# Patient Record
Sex: FEMALE | Race: WHITE | NOT HISPANIC OR LATINO | Employment: UNEMPLOYED | URBAN - METROPOLITAN AREA
[De-identification: names, ages, dates, MRNs, and addresses within clinical notes are randomized per-mention and may not be internally consistent; named-entity substitution may affect disease eponyms.]

---

## 2017-09-07 ENCOUNTER — ALLSCRIPTS OFFICE VISIT (OUTPATIENT)
Dept: OTHER | Facility: OTHER | Age: 37
End: 2017-09-07

## 2017-09-11 LAB
HPV 18 (HISTORICAL): NOT DETECTED
HPV HIGH RISK 16/18 (HISTORICAL): NOT DETECTED
HPV16 (HISTORICAL): NOT DETECTED
PAP (HISTORICAL): NORMAL

## 2018-01-13 VITALS
DIASTOLIC BLOOD PRESSURE: 80 MMHG | SYSTOLIC BLOOD PRESSURE: 116 MMHG | BODY MASS INDEX: 22.66 KG/M2 | WEIGHT: 141 LBS | HEIGHT: 66 IN

## 2018-01-16 NOTE — PROGRESS NOTES
2016         RE: Marderickhelen Murillo                                  To: [facility]   MR#: [id number]                                   [address]   : [facility]   ENC: [id number]                             Fax: [phone]   (Exam #: [id number])      The LMP of this 28year old,  5, para 3 patient was ,   giving her an RUPA of 2016 and a current gestational age of 29 weeks   6 days by dates  A sonographic examination was performed on 2016   using real time equipment  The ultrasound examination was performed using   abdominal technique  The patient has a BMI of 25 3  Her blood pressure   today was 119/73  Earliest ultrasound found in her record: 8/27/15  8w2d  16 RUPA            Cardiac motion was observed at 144 bpm       INDICATIONS      advanced maternal age   abnormal uterine Doppler      Exam Types      Level I      RESULTS      Fetus # 1 of 1   Vertex presentation   Fetal growth appeared normal   Placenta Location = Posterior, fundal   No placenta previa   Placenta Grade = I      MEASUREMENTS (* Included In Average GA)      OFD             10 8 cm   AC              30 6 cm        34 weeks 5 days* (61%)   BPD              8 6 cm        34 weeks 4 days* (57%)   HC              30 8 cm        33 weeks 6 days* (36%)   Femur            6 5 cm        33 weeks 1 day * (41%)      HC/AC           1 01   FL/AC           0 21   FL/BPD          0 75   EFW (Ac/Fl/Hc)  2355 grams - 5 lbs 3 oz                 (47%)      THE AVERAGE GESTATIONAL AGE is 34 weeks 1 day +/- 21 days        AMNIOTIC FLUID      Q1: 4 5      Q2: 2 0      Q3: 3 7      Q4: 3 4   VIVIANA Total = 13 7 cm   Amniotic Fluid: Normal      FETAL VESSELS                                     S/D   PI    RI    PSV   AEDV RF                                                    cm/s       Umbilical Artery                 0 96              No No      ANATOMY DETAILS      Visualized Appearing Sonographically Normal:   HEAD: (BPD Level, Lateral Ventricles); HEART: (Four Chamber View,   Proximal Left Outflow, Proximal Right Outflow, Cardiac Axis, Cardiac   Position);    STOMACH, RIGHT KIDNEY, LEFT KIDNEY, BLADDER, PLACENTA, UMBL  CORD      Not Visualized:   HEAD: (Cerebellum, Cisterna Magna)      IMPRESSION      [place] IUP   34 weeks and 1 day by this ultrasound  (RUPA=MAR 30 2016)   Vertex presentation   Fetal growth appeared normal   Regular fetal heart rate of 144 bpm   Posterior, fundal placenta   No placenta previa      GENERAL COMMENT      On exam today the patient appears well, in no acute distress, and denies   any complaints other relatively frequent Oh Bhardwaj contractions   Her   abdomen is non-tender  There has been appropriate interval fetal growth  Good fetal movement and   tone are seen  The amniotic fluid volume appears normal   The placenta is   posterior and it appears sonographically normal   Fetal Dopplers are   normal for gestational age  The anatomic structures listed above could   not be optimally imaged today because of fetal position; however, these   structures were seen on a prior ultrasound and appeared sonographically   normal   The patient was informed of today's findings and all of her   questions were answered  The limitations of ultrasound were reviewed with   the patient, which she accepts   labor precautions were discussed   and the patient was encouraged to contact her Obstetrician should she   experience any signs or symptoms of  labor  Fetal movement   assessment was reviewed with the patient  The patient verbalized her   understanding of these instructions  Recommend further ultrasounds as clinically indicated        Please note, in addition to the time spent discussing the results of the   ultrasound, I spent approximately 10 minutes of face-to-face time with the   patient, greater than 50% of which was spent in counseling and the   coordination of care for this patient  Thank you very much for allowing us to participate in the care of this   very nice patient  Should you have any questions, please do not hesitate   to contact our office  CATHY Linares M D     Electronically signed 02/18/16 12:43

## 2018-08-23 ENCOUNTER — TELEPHONE (OUTPATIENT)
Dept: OBGYN CLINIC | Facility: CLINIC | Age: 38
End: 2018-08-23

## 2018-09-13 ENCOUNTER — ANNUAL EXAM (OUTPATIENT)
Dept: OBGYN CLINIC | Facility: CLINIC | Age: 38
End: 2018-09-13
Payer: COMMERCIAL

## 2018-09-13 VITALS
BODY MASS INDEX: 22.34 KG/M2 | SYSTOLIC BLOOD PRESSURE: 116 MMHG | WEIGHT: 139 LBS | HEIGHT: 66 IN | DIASTOLIC BLOOD PRESSURE: 80 MMHG

## 2018-09-13 DIAGNOSIS — Z01.419 GYNECOLOGIC EXAM NORMAL: Primary | ICD-10-CM

## 2018-09-13 DIAGNOSIS — N92.6 IRREGULAR BLEEDING: ICD-10-CM

## 2018-09-13 PROCEDURE — S0612 ANNUAL GYNECOLOGICAL EXAMINA: HCPCS | Performed by: PHYSICIAN ASSISTANT

## 2018-09-13 NOTE — PROGRESS NOTES
Assessment/Plan   Problem List Items Addressed This Visit     Gynecologic exam normal - Primary     Pap guidelines reviewed  Pap deferred secondary to negative pap and HPV in 2017  Reviewed spotting a few day prior to menses, reassured can be normal, no polyps or cervical abnormalities seen on exam today  Will get TFTs to further evaluate  If continues, worsens, occurs between menses or with intercourse can consider pelvic ultrasound to evaluate for endometrial polyp  Patient will continue to monitor  Return to office for annual or as needed  Other Visit Diagnoses     Irregular bleeding        Relevant Orders    TSH, 3rd generation    T4, free          Subjective:     Patient ID: Meg Chacon is a 40 y o  y o  female  HPI  41 yo seen for annual exam  Menses regular every 24-38, noticing over the past 5 months or so that she gets 2-3 days of spotting prior to menses  No other bleeding in between menses or with intercourse  Has noticed menses heavier and slightly more cramping since she has had kids, tolerable  Denies bowel or bladder issues  Last pap: 9/7/2017 NILM (-)HRHPV  Always normal, monogamous with   The following portions of the patient's history were reviewed and updated as appropriate:   She  has a past medical history of Anxiety; Basal cell carcinoma; Gestational hypertension (3/21/2016); and Varicella  She   Patient Active Problem List    Diagnosis Date Noted    Gynecologic exam normal 09/13/2018    Other fatigue 12/18/2015    Advanced maternal age in multigravida 10/23/2015     She  has a past surgical history that includes [place] tooth extraction and Ovary surgery    Her family history includes Asthma in her mother, sister, and sister; Colon cancer in her paternal uncle; Dementia in her paternal grandfather; Diabetes in her father and paternal grandfather; Diabetes type II in her paternal grandfather; Heart attack in her maternal grandfather; Hypertension in her mother; Hyperthyroidism in her sister; Hypothyroidism in her maternal grandmother; Lung cancer in her maternal grandmother  She  reports that she has never smoked  She has never used smokeless tobacco  She reports that she does not drink alcohol or use drugs  Current Outpatient Prescriptions   Medication Sig Dispense Refill    acetaminophen (TYLENOL) 325 mg tablet Take 2 tablets (650 mg total) by mouth every 4 (four) hours as needed for headaches  30 tablet 0    Pediatric Multiple Vit-C-FA (FLINSTONES GUMMIES OMEGA-3 DHA) CHEW Chew 1 tablet daily Indications: pt doesnt take regulary  No current facility-administered medications for this visit  She has No Known Allergies       Menstrual History:  OB History      Para Term  AB Living    5 4 4   1 4    SAB TAB Ectopic Multiple Live Births    1     0 4        Obstetric Comments    [place], abnormal uterine artery dopplers         Menarche age: 15  Patient's last menstrual period was 2018 (exact date)  Period Cycle (Days): 28  Period Duration (Days): 5-6  Period Pattern: Regular  Menstrual Flow: Moderate  Dysmenorrhea: None      Review of Systems   Constitutional: Negative for fatigue, fever and unexpected weight change  HENT: Negative for dental problem and sinus pressure  Eyes: Negative for visual disturbance  Respiratory: Negative for cough, shortness of breath and wheezing  Cardiovascular: Negative for chest pain  Gastrointestinal: Negative for abdominal pain, blood in stool, constipation, diarrhea, nausea and vomiting  Endocrine: Negative for polydipsia  Genitourinary: Negative for difficulty urinating, dyspareunia, dysuria, frequency, hematuria, pelvic pain and urgency  Musculoskeletal: Negative for arthralgias and back pain  Neurological: Negative for dizziness, seizures, light-headedness and headaches  Psychiatric/Behavioral: Negative for suicidal ideas  The patient is not nervous/anxious  Objective:  Vitals:    09/13/18 1042   BP: 116/80   BP Location: Left arm   Patient Position: Sitting   Cuff Size: Standard   Weight: 63 kg (139 lb)   Height: 5' 6" (1 676 m)      Physical Exam   Constitutional: She is oriented to person, place, and time  She appears well-developed and well-nourished  Genitourinary: Vagina normal and uterus normal  There is no rash, tenderness, lesion, injury or Bartholin's cyst on the right labia  There is no rash, tenderness, lesion, injury or Bartholin's cyst on the left labia  Vagina exhibits no lesion  No erythema, tenderness or bleeding in the vagina  No signs of injury around the vagina  No vaginal discharge found  Right adnexum does not display mass, does not display tenderness and does not display fullness  Left adnexum does not display mass, does not display tenderness and does not display fullness  Cervix does not exhibit motion tenderness, lesion or discharge  Uterus is not enlarged, tender, exhibiting a mass, irregular (is regular) or mobile  HENT:   Head: Normocephalic and atraumatic  Neck: No thyromegaly present  Cardiovascular: Normal rate, regular rhythm and normal heart sounds  Exam reveals no gallop and no friction rub  No murmur heard  Pulmonary/Chest: Effort normal and breath sounds normal  No respiratory distress  She has no wheezes  Right breast exhibits no inverted nipple, no mass, no nipple discharge, no skin change and no tenderness  Left breast exhibits no inverted nipple, no mass, no nipple discharge, no skin change and no tenderness  Breasts are symmetrical  There is no breast swelling  Abdominal: Soft  She exhibits no distension and no mass  There is no tenderness  There is no rebound and no guarding  No hernia  Lymphadenopathy:     She has no cervical adenopathy  Right: No inguinal adenopathy present  Left: No inguinal adenopathy present  Neurological: She is alert and oriented to person, place, and time     Skin: Skin is warm and dry  Psychiatric: She has a normal mood and affect   Her behavior is normal

## 2018-09-13 NOTE — ASSESSMENT & PLAN NOTE
Pap guidelines reviewed  Pap deferred secondary to negative pap and HPV in 2017  Reviewed spotting a few day prior to menses, reassured can be normal, no polyps or cervical abnormalities seen on exam today  Will get TFTs to further evaluate  If continues, worsens, occurs between menses or with intercourse can consider pelvic ultrasound to evaluate for endometrial polyp  Patient will continue to monitor  Return to office for annual or as needed

## 2019-09-27 ENCOUNTER — ANNUAL EXAM (OUTPATIENT)
Dept: OBGYN CLINIC | Facility: CLINIC | Age: 39
End: 2019-09-27
Payer: COMMERCIAL

## 2019-09-27 VITALS
WEIGHT: 140 LBS | DIASTOLIC BLOOD PRESSURE: 78 MMHG | BODY MASS INDEX: 22.5 KG/M2 | SYSTOLIC BLOOD PRESSURE: 112 MMHG | HEIGHT: 66 IN

## 2019-09-27 DIAGNOSIS — Z01.419 GYNECOLOGIC EXAM NORMAL: Primary | ICD-10-CM

## 2019-09-27 PROCEDURE — S0612 ANNUAL GYNECOLOGICAL EXAMINA: HCPCS | Performed by: PHYSICIAN ASSISTANT

## 2019-09-27 NOTE — PROGRESS NOTES
Assessment/Plan   Problem List Items Addressed This Visit        Other    Gynecologic exam normal - Primary     Pap guidelines reviewed  Pap deferred secondary to negative pap and HPV in 2017 in this low risk patient  Reviewed umbilical hernia and small diastasis recti in length with patient  Aware may need surgery for umbilical hernia at some point, would preferably wait until she is done having children  Reviewed s/s of strangulation to go to ER with  Return to office for annual or as needed  Subjective:     Patient ID: Sana Stokes is a 45 y o  y o  female  HPI  44 yo seen for annual exam  Menses regular, heavy at times, but tolerable  Denies bowel or bladder issues  Using barrier methods for birth control declines another method at this time  Patient has umbilical hernia, reports hurts every now and again but pain subsides quickly  Unsure if done having children at this time  Last pap: 9/7/2017 NILM (-)HRHPV  The following portions of the patient's history were reviewed and updated as appropriate:   She  has a past medical history of Anxiety, Basal cell carcinoma, Gestational hypertension (3/21/2016), and Varicella  She   Patient Active Problem List    Diagnosis Date Noted    Gynecologic exam normal 09/13/2018    Other fatigue 12/18/2015    Advanced maternal age in multigravida 10/23/2015     She  has a past surgical history that includes [place] tooth extraction and Ovary surgery  Her family history includes Asthma in her mother, sister, and sister; Colon cancer in her paternal uncle; Dementia in her paternal grandfather; Diabetes in her father and paternal grandfather; Diabetes type II in her paternal grandfather; Heart attack in her maternal grandfather; Hypertension in her mother; Hyperthyroidism in her sister; Hypothyroidism in her maternal grandmother; Lung cancer in her maternal grandmother  She  reports that she has never smoked   She has never used smokeless tobacco  She reports that she does not drink alcohol or use drugs  No current outpatient medications on file  No current facility-administered medications for this visit  She has No Known Allergies       Menstrual History:  OB History        5    Para   4    Term   4            AB   1    Living   4       SAB   1    TAB        Ectopic        Multiple   0    Live Births   4           Obstetric Comments   [place], abnormal uterine artery dopplers            Menarche age: 15  Patient's last menstrual period was 2019  Period Cycle (Days): 27  Period Duration (Days): 6-7  Period Pattern: Regular  Menstrual Flow: Moderate, Heavy  Dysmenorrhea: None      Review of Systems   Constitutional: Negative for fatigue, fever and unexpected weight change  HENT: Negative for dental problem and sinus pressure  Eyes: Negative for visual disturbance  Respiratory: Negative for cough, shortness of breath and wheezing  Cardiovascular: Negative for chest pain  Gastrointestinal: Negative for abdominal pain, blood in stool, constipation, diarrhea, nausea and vomiting  Endocrine: Negative for polydipsia  Genitourinary: Negative for difficulty urinating, dyspareunia, dysuria, frequency, hematuria, pelvic pain and urgency  Musculoskeletal: Negative for arthralgias and back pain  Neurological: Negative for dizziness, seizures, light-headedness and headaches  Psychiatric/Behavioral: Negative for suicidal ideas  The patient is not nervous/anxious  Objective:  Vitals:    19 1030   BP: 112/78   BP Location: Left arm   Patient Position: Sitting   Cuff Size: Standard   Weight: 63 5 kg (140 lb)   Height: 5' 6" (1 676 m)      Physical Exam   Constitutional: She is oriented to person, place, and time  She appears well-developed and well-nourished  Genitourinary: Vagina normal and uterus normal  There is no rash, tenderness, lesion, injury or Bartholin's cyst on the right labia   There is no rash, tenderness, lesion, injury or Bartholin's cyst on the left labia  Vagina exhibits no lesion  No erythema, tenderness or bleeding in the vagina  No signs of injury around the vagina  No vaginal discharge found  Right adnexum does not display mass, does not display tenderness and does not display fullness  Left adnexum does not display mass, does not display tenderness and does not display fullness  Cervix does not exhibit motion tenderness, lesion or discharge  Uterus is not enlarged, tender, exhibiting a mass, irregular (is regular) or mobile  HENT:   Head: Normocephalic and atraumatic  Neck: No thyromegaly present  Cardiovascular: Normal rate, regular rhythm and normal heart sounds  Exam reveals no gallop and no friction rub  No murmur heard  Pulmonary/Chest: Effort normal and breath sounds normal  No respiratory distress  She has no wheezes  Right breast exhibits no inverted nipple, no mass, no nipple discharge, no skin change and no tenderness  Left breast exhibits no inverted nipple, no mass, no nipple discharge, no skin change and no tenderness  No breast swelling  Breasts are symmetrical    Abdominal: Soft  She exhibits no distension and no mass  There is no tenderness  There is no rebound and no guarding  A hernia (umbilical hernia, reducible without pain) is present  Lymphadenopathy:     She has no cervical adenopathy  Right: No inguinal adenopathy present  Left: No inguinal adenopathy present  Neurological: She is alert and oriented to person, place, and time  Skin: Skin is warm and dry  Psychiatric: She has a normal mood and affect   Her behavior is normal

## 2019-09-27 NOTE — ASSESSMENT & PLAN NOTE
Pap guidelines reviewed  Pap deferred secondary to negative pap and HPV in 2017 in this low risk patient  Reviewed umbilical hernia and small diastasis recti in length with patient  Aware may need surgery for umbilical hernia at some point, would preferably wait until she is done having children  Reviewed s/s of strangulation to go to ER with  Return to office for annual or as needed

## 2019-10-19 ENCOUNTER — DOCUMENTATION (OUTPATIENT)
Dept: LABOR AND DELIVERY | Facility: HOSPITAL | Age: 39
End: 2019-10-19

## 2019-10-19 DIAGNOSIS — N92.6 IRREGULAR MENSES: Primary | ICD-10-CM

## 2019-10-20 ENCOUNTER — APPOINTMENT (OUTPATIENT)
Dept: LAB | Facility: MEDICAL CENTER | Age: 39
End: 2019-10-20
Payer: COMMERCIAL

## 2019-10-20 DIAGNOSIS — N92.6 IRREGULAR MENSES: ICD-10-CM

## 2019-10-20 LAB
B-HCG SERPL-ACNC: <2 MIU/ML
BASOPHILS # BLD AUTO: 0.05 THOUSANDS/ΜL (ref 0–0.1)
BASOPHILS NFR BLD AUTO: 1 % (ref 0–1)
EOSINOPHIL # BLD AUTO: 0.39 THOUSAND/ΜL (ref 0–0.61)
EOSINOPHIL NFR BLD AUTO: 6 % (ref 0–6)
ERYTHROCYTE [DISTWIDTH] IN BLOOD BY AUTOMATED COUNT: 13 % (ref 11.6–15.1)
FSH SERPL-ACNC: 6 MIU/ML
HCT VFR BLD AUTO: 38.1 % (ref 34.8–46.1)
HGB BLD-MCNC: 12 G/DL (ref 11.5–15.4)
IMM GRANULOCYTES # BLD AUTO: 0.01 THOUSAND/UL (ref 0–0.2)
IMM GRANULOCYTES NFR BLD AUTO: 0 % (ref 0–2)
LH SERPL-ACNC: 9.3 MIU/ML
LYMPHOCYTES # BLD AUTO: 2.22 THOUSANDS/ΜL (ref 0.6–4.47)
LYMPHOCYTES NFR BLD AUTO: 36 % (ref 14–44)
MCH RBC QN AUTO: 27.3 PG (ref 26.8–34.3)
MCHC RBC AUTO-ENTMCNC: 31.5 G/DL (ref 31.4–37.4)
MCV RBC AUTO: 87 FL (ref 82–98)
MONOCYTES # BLD AUTO: 0.73 THOUSAND/ΜL (ref 0.17–1.22)
MONOCYTES NFR BLD AUTO: 12 % (ref 4–12)
NEUTROPHILS # BLD AUTO: 2.81 THOUSANDS/ΜL (ref 1.85–7.62)
NEUTS SEG NFR BLD AUTO: 45 % (ref 43–75)
NRBC BLD AUTO-RTO: 0 /100 WBCS
PLATELET # BLD AUTO: 289 THOUSANDS/UL (ref 149–390)
PMV BLD AUTO: 9.2 FL (ref 8.9–12.7)
RBC # BLD AUTO: 4.39 MILLION/UL (ref 3.81–5.12)
T4 FREE SERPL-MCNC: 0.89 NG/DL (ref 0.76–1.46)
TSH SERPL DL<=0.05 MIU/L-ACNC: 3.29 UIU/ML (ref 0.36–3.74)
WBC # BLD AUTO: 6.21 THOUSAND/UL (ref 4.31–10.16)

## 2019-10-20 PROCEDURE — 84702 CHORIONIC GONADOTROPIN TEST: CPT

## 2019-10-20 PROCEDURE — 85025 COMPLETE CBC W/AUTO DIFF WBC: CPT

## 2019-10-20 PROCEDURE — 36415 COLL VENOUS BLD VENIPUNCTURE: CPT

## 2019-10-20 PROCEDURE — 83001 ASSAY OF GONADOTROPIN (FSH): CPT

## 2019-10-20 PROCEDURE — 84443 ASSAY THYROID STIM HORMONE: CPT

## 2019-10-20 PROCEDURE — 84439 ASSAY OF FREE THYROXINE: CPT

## 2019-10-20 PROCEDURE — 83002 ASSAY OF GONADOTROPIN (LH): CPT

## 2020-04-24 ENCOUNTER — DOCUMENTATION (OUTPATIENT)
Dept: OBGYN CLINIC | Facility: CLINIC | Age: 40
End: 2020-04-24

## 2020-04-24 DIAGNOSIS — N92.6 IRREGULAR MENSES: Primary | ICD-10-CM

## 2020-12-09 ENCOUNTER — ANNUAL EXAM (OUTPATIENT)
Dept: OBGYN CLINIC | Facility: CLINIC | Age: 40
End: 2020-12-09
Payer: COMMERCIAL

## 2020-12-09 VITALS
WEIGHT: 140.2 LBS | SYSTOLIC BLOOD PRESSURE: 134 MMHG | BODY MASS INDEX: 22.53 KG/M2 | HEIGHT: 66 IN | DIASTOLIC BLOOD PRESSURE: 86 MMHG

## 2020-12-09 DIAGNOSIS — Z01.419 ENCOUNTER FOR GYNECOLOGICAL EXAMINATION (GENERAL) (ROUTINE) WITHOUT ABNORMAL FINDINGS: Primary | ICD-10-CM

## 2020-12-09 DIAGNOSIS — N92.6 IRREGULAR MENSTRUAL BLEEDING: ICD-10-CM

## 2020-12-09 DIAGNOSIS — Z12.31 ENCOUNTER FOR SCREENING MAMMOGRAM FOR MALIGNANT NEOPLASM OF BREAST: ICD-10-CM

## 2020-12-09 PROCEDURE — G0145 SCR C/V CYTO,THINLAYER,RESCR: HCPCS | Performed by: PHYSICIAN ASSISTANT

## 2020-12-09 PROCEDURE — S0612 ANNUAL GYNECOLOGICAL EXAMINA: HCPCS | Performed by: PHYSICIAN ASSISTANT

## 2020-12-09 RX ORDER — DIPHENOXYLATE HYDROCHLORIDE AND ATROPINE SULFATE 2.5; .025 MG/1; MG/1
1 TABLET ORAL DAILY
COMMUNITY
End: 2022-07-06

## 2020-12-14 LAB
LAB AP GYN PRIMARY INTERPRETATION: NORMAL
LAB AP LMP: NORMAL
Lab: NORMAL

## 2022-01-20 ENCOUNTER — ANNUAL EXAM (OUTPATIENT)
Dept: OBGYN CLINIC | Facility: CLINIC | Age: 42
End: 2022-01-20
Payer: COMMERCIAL

## 2022-01-20 VITALS
BODY MASS INDEX: 21.83 KG/M2 | HEIGHT: 66 IN | WEIGHT: 135.8 LBS | DIASTOLIC BLOOD PRESSURE: 72 MMHG | SYSTOLIC BLOOD PRESSURE: 112 MMHG

## 2022-01-20 DIAGNOSIS — Z71.85 HPV VACCINE COUNSELING: ICD-10-CM

## 2022-01-20 DIAGNOSIS — Z01.419 ENCOUNTER FOR GYNECOLOGICAL EXAMINATION (GENERAL) (ROUTINE) WITHOUT ABNORMAL FINDINGS: Primary | ICD-10-CM

## 2022-01-20 DIAGNOSIS — Z12.31 ENCOUNTER FOR SCREENING MAMMOGRAM FOR MALIGNANT NEOPLASM OF BREAST: ICD-10-CM

## 2022-01-20 PROCEDURE — S0612 ANNUAL GYNECOLOGICAL EXAMINA: HCPCS | Performed by: PHYSICIAN ASSISTANT

## 2022-01-20 NOTE — PROGRESS NOTES
Assessment/Plan   Diagnoses and all orders for this visit:    Encounter for gynecological examination (general) (routine) without abnormal findings  The current ASCCP guidelines were reviewed  Patient's last pap was 12/9/20 - WNL and therefore, a pap with HPV cotesting is not indicated at this time  I emphasized the importance of an annual pelvic and breast exam  Patient ok to defer pap today  Encounter for screening mammogram for malignant neoplasm of breast  -     Mammo screening bilateral w 3d & cad; Future  A yearly mammogram is recommended for breast cancer screening starting at age 36;     HPV vaccine counseling  The patient has not had the Gardasil vaccine series, which is recommended for patients from 10-36 years of age  Reviewed and declines at this time  Discussion  I have discussed the importance of monthly self-breast exams, exercise and healthy diet as well as adequate intake of calcium and vitamin D  Encourage MVI q day and r/jrody importance of folic acid; Encourage 30-40 min weight bearing exercise most days of week  Encourage safe sexual practices; STI testing - declines  Contraception - condoms working well  In addition, colon cancer screening with a colonoscopy is recommended starting at age 36-53 and reviewed benefits  All questions have been answered to her satisfaction  RTO for APE or sooner if needed    Subjective     HPI   Melissa Conley is a 39 y o  female who presents for annual well woman exam    Menarche - 12; LMP - 1/1/22; Periods are reg q month and last 5-7 Days - starts out with spotting for a couple days leading into the regular flow; rarely may spot mid-cycle - brown discharge; No excessive bleeding; Cramps are tolerable without meds for the most part but takes ibuprofen if needed  No vulvar itch/burn; No vaginal itch/burn; No abn discharge or odor;  No urinary sx - burning/pain/frequency/hematuria  (+) SBEs - no breast masses, asymmetry, nipple discharge or bleeding, changes in skin of breast, or breast tenderness bilaterally  No abd/pelvic pain or Has out of the ordinary; does have issues with abdominal hernias - needs to follow-up with a general surgeon - has recommendations  No menopausal symptoms: No hot flashes/night sweats, problems with intercourse, vaginal dryness; sleeping well;   Pt is sexually active in a mutually monog/ sexual relationship; No issues with intercourse; She declines sti/hiv/hep testing; Feels safe at home  Current contraception: condoms  Gardasil - not done  (+) PCP for routine Bw/care; Last Pap - 12/9/20 - WNL; 9/7/17 - WNL (-) HRHPV type 16/18 neg  History of abnormal Pap smear: no  Last mammo - none  History of abnormal mammogram:   Last colonoscopy - none    Review of Systems   Constitutional: Negative for activity change, fatigue, fever and unexpected weight change  HENT: Negative for congestion, dental problem, sinus pressure and sinus pain  Eyes: Negative for visual disturbance  Respiratory: Negative for cough, shortness of breath and wheezing  Cardiovascular: Negative for chest pain and leg swelling  Gastrointestinal: Negative for abdominal distention, abdominal pain, blood in stool, constipation, diarrhea, nausea and vomiting  Endocrine: Negative for polydipsia  Genitourinary: Negative for difficulty urinating, dyspareunia, dysuria, frequency, hematuria, menstrual problem, pelvic pain, urgency, vaginal bleeding, vaginal discharge and vaginal pain  Musculoskeletal: Negative for arthralgias and back pain  Allergic/Immunologic: Negative for environmental allergies  Neurological: Negative for dizziness, seizures and headaches  Psychiatric/Behavioral: Negative for dysphoric mood and sleep disturbance  The patient is not nervous/anxious          The following portions of the patient's history were reviewed and updated as appropriate: allergies, current medications, past family history, past medical history, past social history, past surgical history and problem list          OB History        5    Para   4    Term   4            AB   1    Living   4       SAB   1    IAB        Ectopic        Multiple   0    Live Births   4           Obstetric Comments   : 46 SAB;   F;   M;   F;   M             Past Medical History:   Diagnosis Date    Anxiety     history of , no medication    Basal cell carcinoma     Gestational hypertension     Umbilical hernia     Varicella     had as child       Past Surgical History:   Procedure Laterality Date    OVARY SURGERY      ovarian tursion laparscopic  right side     WISDOM TOOTH EXTRACTION             Family History   Problem Relation Age of Onset    Hypertension Mother     Asthma Mother     Diabetes Father     Hyperthyroidism Sister    [facility] Asthma Sister     Asthma Sister     Lung cancer Maternal Grandmother     Hypothyroidism Maternal Grandmother     Heart attack Maternal Grandfather     Diabetes Paternal Grandfather     Diabetes type II Paternal Grandfather     Dementia Paternal Grandfather     Colon cancer Paternal Uncle        Social History     Socioeconomic History    Marital status: /Civil Union     Spouse name: Not on file    Number of children: Not on file    Years of education: Not on file    Highest education level: Not on file   Occupational History    Not on file   Tobacco Use    Smoking status: Never Smoker    Smokeless tobacco: Never Used   Vaping Use    Vaping Use: Never used   Substance and Sexual Activity    Alcohol use:  Yes    Drug use: No    Sexual activity: Yes     Partners: Male   Other Topics Concern    Not on file   Social History Narrative    Not on file     Social Determinants of Health     Financial Resource Strain: Not on file   Food Insecurity: Not on file   Transportation Needs: Not on file   Physical Activity: Not on file   Stress: Not on file   Social Connections: Not on file Intimate Partner Violence: Not on file   Housing Stability: Not on file         Current Outpatient Medications:     Lactobacillus (PROBIOTIC ACIDOPHILUS PO), Take by mouth, Disp: , Rfl:     multivitamin (THERAGRAN) TABS, Take 1 tablet by mouth daily (Patient not taking: Reported on 1/20/2022 ), Disp: , Rfl:     No Known Allergies    Objective   Vitals:    01/20/22 1025   BP: 112/72   BP Location: Left arm   Patient Position: Sitting   Cuff Size: Standard   Weight: 61 6 kg (135 lb 12 8 oz)   Height: 5' 6" (1 676 m)     Physical Exam  Vitals reviewed  Constitutional:       General: She is awake  She is not in acute distress  Appearance: Normal appearance  She is well-developed, well-groomed and normal weight  She is not ill-appearing, toxic-appearing or diaphoretic  HENT:      Head: Normocephalic and atraumatic  Eyes:      Conjunctiva/sclera: Conjunctivae normal    Neck:      Thyroid: No thyroid mass, thyromegaly or thyroid tenderness  Cardiovascular:      Rate and Rhythm: Normal rate and regular rhythm  Heart sounds: Normal heart sounds  No murmur heard  Pulmonary:      Effort: Pulmonary effort is normal  No tachypnea, bradypnea or respiratory distress  Breath sounds: Normal breath sounds  No stridor or decreased air movement  No wheezing  Chest:   Breasts: Breasts are symmetrical       Right: Normal  No swelling, bleeding, inverted nipple, mass, nipple discharge, skin change, tenderness, axillary adenopathy or supraclavicular adenopathy  Left: Normal  No swelling, bleeding, inverted nipple, mass, nipple discharge, skin change, tenderness, axillary adenopathy or supraclavicular adenopathy  Abdominal:      General: There is no distension  Palpations: Abdomen is soft  There is no hepatomegaly, splenomegaly or mass  Tenderness: There is no abdominal tenderness  Hernia: No hernia is present  There is no hernia in the left inguinal area or right inguinal area  Genitourinary:     General: Normal vulva  Exam position: Supine  Pubic Area: No rash or pubic lice  Labia:         Right: No rash, tenderness, lesion or injury  Left: No rash, tenderness, lesion or injury  Urethra: No prolapse, urethral pain, urethral swelling or urethral lesion  Vagina: Normal  No signs of injury and foreign body  No vaginal discharge, erythema, tenderness, bleeding, lesions or prolapsed vaginal walls  Cervix: No cervical motion tenderness, discharge, friability, lesion, erythema or cervical bleeding  Uterus: Not deviated, not enlarged, not fixed, not tender and no uterine prolapse  Adnexa:         Right: No mass, tenderness or fullness  Left: No mass, tenderness or fullness  Rectum: Normal  Guaiac result negative  No mass, tenderness, anal fissure, external hemorrhoid or internal hemorrhoid  Normal anal tone  Lymphadenopathy:      Cervical: No cervical adenopathy  Upper Body:      Right upper body: No supraclavicular or axillary adenopathy  Left upper body: No supraclavicular or axillary adenopathy  Lower Body: No right inguinal adenopathy  No left inguinal adenopathy  Skin:     General: Skin is warm and dry  Neurological:      Mental Status: She is alert and oriented to person, place, and time  Psychiatric:         Mood and Affect: Mood and affect normal          Speech: Speech normal          Behavior: Behavior normal  Behavior is cooperative  Thought Content:  Thought content normal          Judgment: Judgment normal

## 2022-07-05 ENCOUNTER — TELEPHONE (OUTPATIENT)
Dept: OBGYN CLINIC | Facility: CLINIC | Age: 42
End: 2022-07-05

## 2022-07-05 NOTE — TELEPHONE ENCOUNTER
Pt lmom - has had moderate flow in cycle for last 10 days started about day 17 of cycle and last 4-5 days has had an ache in lower left abdomen that comes and goes and radiates to back and some left sided groin pain for last month or so

## 2022-07-05 NOTE — TELEPHONE ENCOUNTER
Pt states has been bleeding for 10 days which is not typical for her menses  Pt states she has a menstrual cup that she is changing every 4 to 6 hours and reports that is normally the frequency around day 4 of her normal menses  Pt reports bloating, LLQ abdominal aching with intermittent radiating ache to back and down L leg  Pt denies, dizziness, lightheadedness, fatigue, sob, cp  Pt requesting apt, offered and scheduled

## 2022-07-06 ENCOUNTER — OFFICE VISIT (OUTPATIENT)
Dept: OBGYN CLINIC | Facility: CLINIC | Age: 42
End: 2022-07-06
Payer: COMMERCIAL

## 2022-07-06 ENCOUNTER — APPOINTMENT (OUTPATIENT)
Dept: LAB | Facility: MEDICAL CENTER | Age: 42
End: 2022-07-06
Payer: COMMERCIAL

## 2022-07-06 VITALS
HEIGHT: 66 IN | DIASTOLIC BLOOD PRESSURE: 76 MMHG | BODY MASS INDEX: 22.34 KG/M2 | SYSTOLIC BLOOD PRESSURE: 122 MMHG | WEIGHT: 139 LBS

## 2022-07-06 DIAGNOSIS — N92.6 IRREGULAR MENSES: Primary | ICD-10-CM

## 2022-07-06 DIAGNOSIS — N92.6 IRREGULAR MENSES: ICD-10-CM

## 2022-07-06 DIAGNOSIS — R10.2 PELVIC PAIN: ICD-10-CM

## 2022-07-06 PROBLEM — K42.9 UMBILICAL HERNIA: Status: ACTIVE | Noted: 2020-04-22

## 2022-07-06 LAB
BASOPHILS # BLD AUTO: 0.04 THOUSANDS/ΜL (ref 0–0.1)
BASOPHILS NFR BLD AUTO: 1 % (ref 0–1)
EOSINOPHIL # BLD AUTO: 0.26 THOUSAND/ΜL (ref 0–0.61)
EOSINOPHIL NFR BLD AUTO: 5 % (ref 0–6)
ERYTHROCYTE [DISTWIDTH] IN BLOOD BY AUTOMATED COUNT: 13.2 % (ref 11.6–15.1)
HCG SERPL QL: NEGATIVE
HCT VFR BLD AUTO: 42.1 % (ref 34.8–46.1)
HGB BLD-MCNC: 13.4 G/DL (ref 11.5–15.4)
IMM GRANULOCYTES # BLD AUTO: 0.02 THOUSAND/UL (ref 0–0.2)
IMM GRANULOCYTES NFR BLD AUTO: 0 % (ref 0–2)
LYMPHOCYTES # BLD AUTO: 1.42 THOUSANDS/ΜL (ref 0.6–4.47)
LYMPHOCYTES NFR BLD AUTO: 28 % (ref 14–44)
MCH RBC QN AUTO: 28.2 PG (ref 26.8–34.3)
MCHC RBC AUTO-ENTMCNC: 31.8 G/DL (ref 31.4–37.4)
MCV RBC AUTO: 89 FL (ref 82–98)
MONOCYTES # BLD AUTO: 0.48 THOUSAND/ΜL (ref 0.17–1.22)
MONOCYTES NFR BLD AUTO: 9 % (ref 4–12)
NEUTROPHILS # BLD AUTO: 2.9 THOUSANDS/ΜL (ref 1.85–7.62)
NEUTS SEG NFR BLD AUTO: 57 % (ref 43–75)
NRBC BLD AUTO-RTO: 0 /100 WBCS
PLATELET # BLD AUTO: 319 THOUSANDS/UL (ref 149–390)
PMV BLD AUTO: 9.3 FL (ref 8.9–12.7)
RBC # BLD AUTO: 4.75 MILLION/UL (ref 3.81–5.12)
T4 FREE SERPL-MCNC: 0.94 NG/DL (ref 0.76–1.46)
TSH SERPL DL<=0.05 MIU/L-ACNC: 2.15 UIU/ML (ref 0.45–4.5)
WBC # BLD AUTO: 5.12 THOUSAND/UL (ref 4.31–10.16)

## 2022-07-06 PROCEDURE — 84439 ASSAY OF FREE THYROXINE: CPT

## 2022-07-06 PROCEDURE — 36415 COLL VENOUS BLD VENIPUNCTURE: CPT

## 2022-07-06 PROCEDURE — 84703 CHORIONIC GONADOTROPIN ASSAY: CPT

## 2022-07-06 PROCEDURE — 84443 ASSAY THYROID STIM HORMONE: CPT

## 2022-07-06 PROCEDURE — 85025 COMPLETE CBC W/AUTO DIFF WBC: CPT

## 2022-07-06 PROCEDURE — 99214 OFFICE O/P EST MOD 30 MIN: CPT | Performed by: PHYSICIAN ASSISTANT

## 2022-07-06 NOTE — PROGRESS NOTES
Assessment/Plan:    Irregular menses  Reviewed irregular bleeding in length with patient including possible causes  Will plan labs including thyroid studies, CBC, and pregnancy test to further evaluate  Script for pelvic ultrasound given to evaluate pain  Office will call with results and appropriate follow up  Will continue to monitor symptoms in the meantime and will call if worsening  Problem List Items Addressed This Visit        Other    Irregular menses - Primary     Reviewed irregular bleeding in length with patient including possible causes  Will plan labs including thyroid studies, CBC, and pregnancy test to further evaluate  Script for pelvic ultrasound given to evaluate pain  Office will call with results and appropriate follow up  Will continue to monitor symptoms in the meantime and will call if worsening  Relevant Orders    US pelvis complete w transvaginal    CBC and differential (Completed)    TSH, 3rd generation (Completed)    T4, free (Completed)    Pregnancy Test (HCG Qualitative) (Completed)      Other Visit Diagnoses     Pelvic pain        Relevant Orders    US pelvis complete w transvaginal            Subjective:      Patient ID: Colby Watkins is a 39 y o  female  HPI  38 yo seen for irregular bleeding  Menses typically every 23-28 days apart, sometimes will have spotting a few days prior to menses  LMP: 6/10/2022  This past month started with spotting day 16 of cycle and was heavier spotting than used to  5-6 days ago noticing left lower quadrant pain  Today has not noticed any pain  Did have breast soreness prior to bleeding starting  1st day heavy spotting, Never got to a full fledge menses  Using menstrual cup  Bleeding has been coming and going  Two days ago picked up a little bit but then went away  Typically has some spotting a few days before  Has noticed groin pain for the last couple of years   Reports was playing ultimate [place] and feels like pulled muscle in groin but pelvic pain she had the other day shot down into that area  Currently using condoms for birth control  Has not taken pregnancy test    The following portions of the patient's history were reviewed and updated as appropriate:   She  has a past medical history of Anxiety, Basal cell carcinoma, Gestational hypertension (8/43/5573), Umbilical hernia, and Varicella  She   Patient Active Problem List    Diagnosis Date Noted    Irregular menses 07/07/2022    Encounter for gynecological examination (general) (routine) without abnormal findings 12/09/2020    Irregular menstrual bleeding 73/46/7004    Umbilical hernia 95/32/4675     She  has a past surgical history that includes [place] tooth extraction and Ovary surgery  Her family history includes Asthma in her mother, sister, and sister; Colon cancer in her paternal uncle; Dementia in her paternal grandfather; Diabetes in her father and paternal grandfather; Diabetes type II in her paternal grandfather; Endometriosis in her sister; Heart attack in her maternal grandfather; Hypertension in her mother; Hyperthyroidism in her sister; Hypothyroidism in her maternal grandmother; Lung cancer in her maternal grandmother  She  reports that she has never smoked  She has never used smokeless tobacco  She reports current alcohol use  She reports that she does not use drugs  Current Outpatient Medications   Medication Sig Dispense Refill    Lactobacillus (PROBIOTIC ACIDOPHILUS PO) Take by mouth       No current facility-administered medications for this visit  She has No Known Allergies       Review of Systems   Constitutional: Negative for fatigue, fever and unexpected weight change  HENT: Negative for dental problem and sinus pressure  Eyes: Negative for visual disturbance  Respiratory: Negative for cough, shortness of breath and wheezing  Cardiovascular: Negative for chest pain     Gastrointestinal: Negative for abdominal pain, blood in stool, constipation, diarrhea, nausea and vomiting  Endocrine: Negative for polydipsia  Genitourinary: Positive for menstrual problem and pelvic pain  Negative for difficulty urinating, dyspareunia, dysuria, frequency, hematuria and urgency  Musculoskeletal: Negative for arthralgias and back pain  Neurological: Negative for dizziness, seizures, light-headedness and headaches  Psychiatric/Behavioral: Negative for suicidal ideas  The patient is not nervous/anxious  Objective:      /76   Ht 5' 6" (1 676 m)   Wt 63 kg (139 lb)   LMP 06/10/2022 (Approximate)   BMI 22 44 kg/m²          Physical Exam  Constitutional:       Appearance: Normal appearance  She is well-developed  Neck:      Thyroid: No thyroid mass or thyromegaly  Cardiovascular:      Rate and Rhythm: Normal rate and regular rhythm  Heart sounds: Normal heart sounds  No murmur heard  No friction rub  No gallop  Pulmonary:      Effort: Pulmonary effort is normal  No respiratory distress  Breath sounds: Normal breath sounds  No wheezing or rales  Chest:   Breasts:      Right: No supraclavicular adenopathy  Left: No supraclavicular adenopathy  Abdominal:      General: Bowel sounds are normal  There is no distension  Palpations: Abdomen is soft  There is no mass  Tenderness: There is no abdominal tenderness  There is no guarding or rebound  Genitourinary:     Labia:         Right: No rash, tenderness, lesion or injury  Left: No rash, tenderness, lesion or injury  Urethra: No prolapse, urethral pain, urethral swelling or urethral lesion  Vagina: No signs of injury  Bleeding (small amount of blood in vaginal vault) present  No vaginal discharge, erythema or tenderness  Cervix: No cervical motion tenderness, discharge or friability  Uterus: Not deviated, not enlarged and not tender  Adnexa:         Right: No mass, tenderness or fullness            Left: No mass, tenderness or fullness  Musculoskeletal:      Cervical back: Neck supple  Lymphadenopathy:      Cervical: No cervical adenopathy  Upper Body:      Right upper body: No supraclavicular adenopathy  Left upper body: No supraclavicular adenopathy  Skin:     General: Skin is warm and dry  Coloration: Skin is not pale  Findings: No erythema or rash  Neurological:      Mental Status: She is alert and oriented to person, place, and time  Psychiatric:         Behavior: Behavior normal          Thought Content:  Thought content normal          Judgment: Judgment normal

## 2022-07-06 NOTE — PROGRESS NOTES
Assessment/Plan   Problem List Items Addressed This Visit    None         Subjective:     Patient ID: Filemon Berry is a 39 y o  y o  female  HPI  40 yo seen for annual exam    Last pap: 2020 NILM, 2017 NILM (-)HRHPV  Last mammogram:   The following portions of the patient's history were reviewed and updated as appropriate:   She  has a past medical history of Anxiety, Basal cell carcinoma, Gestational hypertension (), Umbilical hernia, and Varicella  She   Patient Active Problem List    Diagnosis Date Noted    Encounter for gynecological examination (general) (routine) without abnormal findings 2020    Irregular menstrual bleeding     Umbilical hernia      She  has a past surgical history that includes [place] tooth extraction and Ovary surgery  Her family history includes Asthma in her mother, sister, and sister; Colon cancer in her paternal uncle; Dementia in her paternal grandfather; Diabetes in her father and paternal grandfather; Diabetes type II in her paternal grandfather; Endometriosis in her sister; Heart attack in her maternal grandfather; Hypertension in her mother; Hyperthyroidism in her sister; Hypothyroidism in her maternal grandmother; Lung cancer in her maternal grandmother  She  reports that she has never smoked  She has never used smokeless tobacco  She reports current alcohol use  She reports that she does not use drugs  Current Outpatient Medications   Medication Sig Dispense Refill    Lactobacillus (PROBIOTIC ACIDOPHILUS PO) Take by mouth       No current facility-administered medications for this visit  She has No Known Allergies       Menstrual History:  OB History        5    Para   4    Term   4            AB   1    Living   4       SAB   1    IAB        Ectopic        Multiple   0    Live Births   4           Obstetric Comments   :  SAB;   F;   M;   F;   M              Patient's last menstrual period was 06/10/2022 (approximate)  Review of Systems   Constitutional: Negative for fatigue, fever and unexpected weight change  HENT: Negative for dental problem and sinus pressure  Eyes: Negative for visual disturbance  Respiratory: Negative for cough, shortness of breath and wheezing  Cardiovascular: Negative for chest pain  Gastrointestinal: Negative for abdominal pain, blood in stool, constipation, diarrhea, nausea and vomiting  Endocrine: Negative for polydipsia  Genitourinary: Negative for difficulty urinating, dyspareunia, dysuria, frequency, hematuria, pelvic pain and urgency  Musculoskeletal: Negative for arthralgias and back pain  Neurological: Negative for dizziness, seizures, light-headedness and headaches  Psychiatric/Behavioral: Negative for suicidal ideas  The patient is not nervous/anxious  Objective:  Vitals:    07/06/22 1044   BP: 122/76   Weight: 63 kg (139 lb)   Height: 5' 6" (1 676 m)      Physical Exam  Constitutional:       Appearance: Normal appearance  She is well-developed  Genitourinary:      Vulva and bladder normal       No lesions in the vagina  Right Labia: No rash, tenderness, lesions or skin changes  Left Labia: No tenderness, lesions, skin changes or rash  No labial fusion noted  No inguinal adenopathy present in the right or left side  No vaginal discharge, erythema, tenderness or bleeding  Right Adnexa: not tender, not full and no mass present  Left Adnexa: not tender, not full and no mass present  No cervical motion tenderness, discharge or lesion  Uterus is not enlarged, tender or irregular  No uterine mass detected  No urethral prolapse, tenderness or mass present  Bladder is not tender      Breasts: Breasts are symmetrical       Right: No swelling, bleeding, inverted nipple, mass, nipple discharge, skin change, tenderness, axillary adenopathy or supraclavicular adenopathy  Left: No swelling, bleeding, inverted nipple, mass, nipple discharge, skin change, tenderness, axillary adenopathy or supraclavicular adenopathy  HENT:      Head: Normocephalic and atraumatic  Neck:      Thyroid: No thyromegaly  Cardiovascular:      Rate and Rhythm: Normal rate and regular rhythm  Heart sounds: Normal heart sounds  No murmur heard  No friction rub  No gallop  Pulmonary:      Effort: Pulmonary effort is normal  No respiratory distress  Breath sounds: Normal breath sounds  No wheezing  Abdominal:      General: There is no distension  Palpations: Abdomen is soft  There is no mass  Tenderness: There is no abdominal tenderness  There is no guarding or rebound  Hernia: No hernia is present  Lymphadenopathy:      Cervical: No cervical adenopathy  Upper Body:      Right upper body: No supraclavicular, axillary or pectoral adenopathy  Left upper body: No supraclavicular, axillary or pectoral adenopathy  Lower Body: No right inguinal adenopathy  No left inguinal adenopathy  Neurological:      Mental Status: She is alert and oriented to person, place, and time  Skin:     General: Skin is warm and dry     Psychiatric:         Behavior: Behavior normal

## 2022-07-07 PROBLEM — N92.6 IRREGULAR MENSES: Status: ACTIVE | Noted: 2022-07-07

## 2022-07-07 NOTE — ASSESSMENT & PLAN NOTE
Reviewed irregular bleeding in length with patient including possible causes  Will plan labs including thyroid studies, CBC, and pregnancy test to further evaluate  Script for pelvic ultrasound given to evaluate pain  Office will call with results and appropriate follow up  Will continue to monitor symptoms in the meantime and will call if worsening

## 2022-07-08 ENCOUNTER — HOSPITAL ENCOUNTER (OUTPATIENT)
Dept: ULTRASOUND IMAGING | Facility: HOSPITAL | Age: 42
Discharge: HOME | End: 2022-07-08
Attending: PHYSICIAN ASSISTANT
Payer: COMMERCIAL

## 2022-07-08 DIAGNOSIS — R10.2 PELVIC PAIN: ICD-10-CM

## 2022-07-08 DIAGNOSIS — N92.6 IRREGULAR MENSES: ICD-10-CM

## 2022-07-08 PROCEDURE — 76830 TRANSVAGINAL US NON-OB: CPT

## 2022-07-08 PROCEDURE — 76856 US EXAM PELVIC COMPLETE: CPT

## 2022-08-15 ENCOUNTER — PROCEDURE VISIT (OUTPATIENT)
Dept: OBGYN CLINIC | Facility: CLINIC | Age: 42
End: 2022-08-15
Payer: COMMERCIAL

## 2022-08-15 VITALS
WEIGHT: 138.4 LBS | SYSTOLIC BLOOD PRESSURE: 118 MMHG | BODY MASS INDEX: 22.24 KG/M2 | DIASTOLIC BLOOD PRESSURE: 80 MMHG | HEIGHT: 66 IN

## 2022-08-15 DIAGNOSIS — N92.6 IRREGULAR BLEEDING: Primary | ICD-10-CM

## 2022-08-15 LAB — SL AMB POCT URINE HCG: NORMAL

## 2022-08-15 PROCEDURE — 58100 BIOPSY OF UTERUS LINING: CPT | Performed by: PHYSICIAN ASSISTANT

## 2022-08-15 PROCEDURE — 88305 TISSUE EXAM BY PATHOLOGIST: CPT | Performed by: STUDENT IN AN ORGANIZED HEALTH CARE EDUCATION/TRAINING PROGRAM

## 2022-08-15 PROCEDURE — 81025 URINE PREGNANCY TEST: CPT | Performed by: PHYSICIAN ASSISTANT

## 2022-08-15 NOTE — PROGRESS NOTES
Endometrial biopsy    Date/Time: 8/15/2022 8:58 AM  Performed by: Elie Ballesteros PA-C  Authorized by: Elie Ballesteros PA-C   Universal Protocol:  Consent: Verbal consent obtained  Risks and benefits: risks, benefits and alternatives were discussed  Consent given by: patient  Time out: Immediately prior to procedure a "time out" was called to verify the correct patient, procedure, equipment, support staff and site/side marked as required  Patient understanding: patient states understanding of the procedure being performed  Patient consent: the patient's understanding of the procedure matches consent given  Procedure consent: procedure consent matches procedure scheduled  Site marked: the operative site was marked  Radiology Images displayed and confirmed  If images not available, report reviewed: imaging studies available  Required items: required blood products, implants, devices, and special equipment available  Patient identity confirmed: verbally with patient      Indication:     Indications: Other disorder of menstruation and other abnormal bleeding from female genital tract    Procedure:     Procedure: endometrial biopsy with Pipelle      A bivalve speculum was placed in the vagina: yes      Cervix cleaned and prepped: yes      A paracervical block was performed: no      An intracervical block was performed: no      The cervix was dilated: no      Uterus sounded: yes      Uterus sound depth (cm):  8 (RV)    Specimen collected: specimen collected and sent to pathology      Patient tolerated procedure well with no complications: yes    Comments:     Procedure comments:  Pt presents today due to some light irregular spotting that has occurred more so since the birth of her last child  She has noted it more so in the last few months  Pt counseled on need for endometrial biopsy   Aware of risk of risk of infection, uterine perforation and scarring, inability to obtain a sufficient sample and possible need for additional procedures  Pt aware I will call her and review results once available and determine follow up plan as needed  Pt agrees to procedure  All questions answered  Will plan to f/u based on results  Reviewed w pt possible etiologies including hormonal surge and normal endometrial proliferation, hyperplasia and carcinoma  Aware nothing pv x 3 days to reduce risk of infection  Pt aware

## 2024-02-21 PROBLEM — Z01.419 ENCOUNTER FOR GYNECOLOGICAL EXAMINATION (GENERAL) (ROUTINE) WITHOUT ABNORMAL FINDINGS: Status: RESOLVED | Noted: 2020-12-09 | Resolved: 2024-02-21

## 2025-01-28 NOTE — PROGRESS NOTES
Assessment & Plan   Diagnoses and all orders for this visit:    Encounter for gynecological examination (general) (routine) without abnormal findings  -     Liquid-based pap, screening    The current ASCCP guidelines were reviewed. Patient's last pap was 12/9/20 - WNL; 9/7/17 - WNL (-) HRHPV type 16/18 neg and therefore, a pap with HPV cotesting is indicated at this time. I emphasized the importance of an annual pelvic and breast exam. Patient ok to have a pap done today.    Discussion  I have discussed the importance of monthly self-breast exams, exercise and healthy diet as well as adequate intake of calcium and vitamin D. Encourage MVI q day and r/jordy importance of folic acid; Encourage 30-40 min weight bearing exercise most days of week  Encourage safe sexual practices; STI testing - declines  Contraception - condoms working well; not interested in alternate method at this time  A yearly mammogram is recommended for breast cancer screening starting at age 40- scheduled later today; In addition, colon cancer screening with a colonoscopy is recommended starting at age 45-50 and reviewed benefits.  The patient has not had the Gardasil vaccine series, which is recommended for patients from 9-45 years of age. Declined in the past.  All questions have been answered to her satisfaction  RTO for APE or sooner if needed    Subjective     HPI   Roro Minor is a 44 y.o. female who presents for annual well woman exam. History of irreg bleeding in 2022 - 7/8/22 - unremarkable except evidence of currently on period; 8/15/22 - endometrial biopsy benign; For the past year - has been incorporating different seeds in the diet to help regulate hormones and seems to have eliminated her irregular bleeding  LMP - 1/29/25; Periods are reg q month (22-28 days) and last 5-7 days; No excessive bleeding; No intermenstrual bleeding or spotting for the most part - had one cycle where she had some RLQ pain and about 4 days of spotting -  thought possibly a cyst; Cramps are tolerable.  No vulvar itch/burn; No vaginal itch/burn; No abn discharge or odor; No urinary sx - burning/pain/frequency/hematuria  (+) SBEs - no breast masses, asymmetry, nipple discharge or bleeding, changes in skin of breast, or breast tenderness bilaterally  No abd/pelvic pain or HAs;   No menopausal symptoms: No hot flashes/night sweats - did have 2 night sweats the past 2 weeks (reviewed likely due to hormones decreasing leading up to her period); no problems with intercourse, vaginal dryness; sleeping well;   Pt is sexually active in a mutually monog/ sexual relationship; No issues with intercourse; She declines sti/hiv/hep testing; Feels safe at home  Current contraception: condoms  Gardasil - declined in the past  (+) PCP for routine Bw/care;    Last Pap - 12/9/20 - WNL; 9/7/17 - WNL (-) HRHPV type 16/18 neg  History of abnormal Pap smear: no  Last mammo - 1/25/24 - B1TB  History of abnormal mammogram: no  Last colonoscopy - none    Review of Systems   Constitutional:  Negative for activity change, fatigue, fever and unexpected weight change.   HENT:  Negative for congestion, dental problem, sinus pressure and sinus pain.    Eyes:  Negative for visual disturbance.   Respiratory:  Negative for cough, shortness of breath and wheezing.    Cardiovascular:  Negative for chest pain and leg swelling.   Gastrointestinal:  Negative for abdominal distention, abdominal pain, blood in stool, constipation, diarrhea, nausea and vomiting.   Endocrine: Negative for polydipsia.   Genitourinary:  Negative for difficulty urinating, dyspareunia, dysuria, frequency, hematuria, menstrual problem, pelvic pain, urgency, vaginal bleeding, vaginal discharge and vaginal pain.   Musculoskeletal:  Negative for arthralgias and back pain.   Allergic/Immunologic: Negative for environmental allergies.   Neurological:  Negative for dizziness, seizures and headaches.   Psychiatric/Behavioral:   Negative for dysphoric mood and sleep disturbance. The patient is not nervous/anxious.        The following portions of the patient's history were reviewed and updated as appropriate: allergies, current medications, past family history, past medical history, past social history, past surgical history, and problem list.         OB History          5    Para   4    Term   4            AB   1    Living   4         SAB   1    IAB        Ectopic        Multiple   0    Live Births   4           Obstetric Comments   :  SAB;   F;   M;   F;   M               Past Medical History:   Diagnosis Date    Anxiety     history of , no medication    Basal cell carcinoma     Gestational hypertension 3/21/2016    Umbilical hernia     Varicella     had as child       Past Surgical History:   Procedure Laterality Date    HERNIA REPAIR  08/10/2022    OVARY SURGERY      ovarian tursion laparscopic  right side     WISDOM TOOTH EXTRACTION             Family History   Problem Relation Age of Onset    Hypertension Mother     Asthma Mother     Diabetes Father     Hyperthyroidism Sister     Endometriosis Sister     Asthma Sister     Asthma Sister     Lung cancer Maternal Grandmother     Hypothyroidism Maternal Grandmother     Cancer Maternal Grandmother         Lung cancer    Heart attack Maternal Grandfather     Heart failure Maternal Grandfather     Diabetes Paternal Grandfather     Diabetes type II Paternal Grandfather     Dementia Paternal Grandfather     Colon cancer Paternal Uncle     Thyroid disease Sister         Hyperthyroidism       Social History     Socioeconomic History    Marital status: /Civil Union     Spouse name: Not on file    Number of children: Not on file    Years of education: Not on file    Highest education level: Not on file   Occupational History    Not on file   Tobacco Use    Smoking status: Never    Smokeless tobacco: Never   Vaping Use    Vaping status:  "Never Used   Substance and Sexual Activity    Alcohol use: Yes     Comment: Only small amounts once in a while    Drug use: Never    Sexual activity: Yes     Partners: Male     Birth control/protection: Condom Male   Other Topics Concern    Not on file   Social History Narrative    Not on file     Social Drivers of Health     Financial Resource Strain: Not on file   Food Insecurity: Not on file   Transportation Needs: Not on file   Physical Activity: Not on file   Stress: Not on file   Social Connections: Not on file   Intimate Partner Violence: Not on file   Housing Stability: Not on file         Current Outpatient Medications:     ibuprofen (ADVIL,MOTRIN) 100 MG tablet, Take 100 mg by mouth every 6 (six) hours as needed for mild pain, Disp: , Rfl:     Lactobacillus (PROBIOTIC ACIDOPHILUS PO), Take by mouth, Disp: , Rfl:     No Known Allergies    Objective   Vitals:    01/29/25 0943   BP: 124/80   BP Location: Left arm   Patient Position: Sitting   Cuff Size: Standard   Weight: 59 kg (130 lb)   Height: 5' 6\" (1.676 m)     Physical Exam  Vitals reviewed.   Constitutional:       General: She is awake. She is not in acute distress.     Appearance: Normal appearance. She is well-developed and well-groomed. She is not ill-appearing, toxic-appearing or diaphoretic.   HENT:      Head: Normocephalic and atraumatic.   Eyes:      Conjunctiva/sclera: Conjunctivae normal.   Neck:      Thyroid: No thyroid mass, thyromegaly or thyroid tenderness.   Cardiovascular:      Rate and Rhythm: Normal rate and regular rhythm.      Heart sounds: Normal heart sounds. No murmur heard.  Pulmonary:      Effort: Pulmonary effort is normal. No tachypnea, bradypnea or respiratory distress.      Breath sounds: Normal breath sounds. No stridor or decreased air movement. No wheezing.   Chest:   Breasts:     Breasts are symmetrical.      Right: Normal. No swelling, bleeding, inverted nipple, mass, nipple discharge, skin change or tenderness.      " Left: Normal. No swelling, bleeding, inverted nipple, mass, nipple discharge, skin change or tenderness.   Abdominal:      General: There is no distension.      Palpations: Abdomen is soft. There is no hepatomegaly, splenomegaly or mass.      Tenderness: There is no abdominal tenderness.      Hernia: No hernia is present. There is no hernia in the left inguinal area or right inguinal area.   Genitourinary:     General: Normal vulva.      Exam position: Supine.      Pubic Area: No rash or pubic lice.       Labia:         Right: No rash, tenderness, lesion or injury.         Left: No rash, tenderness, lesion or injury.       Urethra: No prolapse, urethral pain, urethral swelling or urethral lesion.      Vagina: Normal. No signs of injury and foreign body. No vaginal discharge, erythema, tenderness, bleeding, lesions or prolapsed vaginal walls.      Cervix: No cervical motion tenderness, discharge, friability, lesion, erythema or cervical bleeding.      Uterus: Not deviated, not enlarged, not fixed, not tender and no uterine prolapse.       Adnexa:         Right: No mass, tenderness or fullness.          Left: No mass, tenderness or fullness.        Rectum: Normal. Guaiac result negative. No mass, tenderness, anal fissure, external hemorrhoid or internal hemorrhoid. Normal anal tone.   Lymphadenopathy:      Cervical: No cervical adenopathy.      Upper Body:      Right upper body: No supraclavicular or axillary adenopathy.      Left upper body: No supraclavicular or axillary adenopathy.      Lower Body: No right inguinal adenopathy. No left inguinal adenopathy.   Skin:     General: Skin is warm and dry.   Neurological:      Mental Status: She is alert and oriented to person, place, and time.   Psychiatric:         Mood and Affect: Mood and affect normal.         Speech: Speech normal.         Behavior: Behavior normal. Behavior is cooperative.         Thought Content: Thought content normal.         Judgment: Judgment  normal.

## 2025-01-29 ENCOUNTER — ANNUAL EXAM (OUTPATIENT)
Dept: OBGYN CLINIC | Facility: CLINIC | Age: 45
End: 2025-01-29
Payer: COMMERCIAL

## 2025-01-29 VITALS
BODY MASS INDEX: 20.89 KG/M2 | DIASTOLIC BLOOD PRESSURE: 80 MMHG | SYSTOLIC BLOOD PRESSURE: 124 MMHG | WEIGHT: 130 LBS | HEIGHT: 66 IN

## 2025-01-29 DIAGNOSIS — Z01.419 ENCOUNTER FOR GYNECOLOGICAL EXAMINATION (GENERAL) (ROUTINE) WITHOUT ABNORMAL FINDINGS: Primary | ICD-10-CM

## 2025-01-29 PROCEDURE — G0476 HPV COMBO ASSAY CA SCREEN: HCPCS | Performed by: PHYSICIAN ASSISTANT

## 2025-01-29 PROCEDURE — G0145 SCR C/V CYTO,THINLAYER,RESCR: HCPCS | Performed by: PHYSICIAN ASSISTANT

## 2025-01-29 PROCEDURE — S0612 ANNUAL GYNECOLOGICAL EXAMINA: HCPCS | Performed by: PHYSICIAN ASSISTANT

## 2025-01-30 LAB
HPV HR 12 DNA CVX QL NAA+PROBE: NEGATIVE
HPV16 DNA CVX QL NAA+PROBE: NEGATIVE
HPV18 DNA CVX QL NAA+PROBE: NEGATIVE

## 2025-02-03 ENCOUNTER — RESULTS FOLLOW-UP (OUTPATIENT)
Dept: OBGYN CLINIC | Facility: CLINIC | Age: 45
End: 2025-02-03

## 2025-02-03 LAB
LAB AP GYN PRIMARY INTERPRETATION: NORMAL
LAB AP LMP: NORMAL
Lab: NORMAL